# Patient Record
Sex: FEMALE | Race: WHITE | NOT HISPANIC OR LATINO | URBAN - METROPOLITAN AREA
[De-identification: names, ages, dates, MRNs, and addresses within clinical notes are randomized per-mention and may not be internally consistent; named-entity substitution may affect disease eponyms.]

---

## 2024-05-03 ENCOUNTER — EMERGENCY (EMERGENCY)
Facility: HOSPITAL | Age: 79
LOS: 1 days | Discharge: HOME | End: 2024-05-03
Admitting: EMERGENCY MEDICINE
Payer: MEDICARE

## 2024-05-03 VITALS
DIASTOLIC BLOOD PRESSURE: 77 MMHG | HEIGHT: 62 IN | OXYGEN SATURATION: 98 % | TEMPERATURE: 99 F | WEIGHT: 117.95 LBS | SYSTOLIC BLOOD PRESSURE: 147 MMHG | RESPIRATION RATE: 18 BRPM | HEART RATE: 68 BPM

## 2024-05-03 PROCEDURE — 73030 X-RAY EXAM OF SHOULDER: CPT | Mod: 26,RT

## 2024-05-03 PROCEDURE — 99283 EMERGENCY DEPT VISIT LOW MDM: CPT | Mod: 25

## 2024-05-03 PROCEDURE — 73030 X-RAY EXAM OF SHOULDER: CPT

## 2024-05-03 PROCEDURE — 99284 EMERGENCY DEPT VISIT MOD MDM: CPT

## 2024-05-03 RX ORDER — OXYCODONE AND ACETAMINOPHEN 5; 325 MG/1; MG/1
1 TABLET ORAL
Qty: 12 | Refills: 0
Start: 2024-05-03 | End: 2024-05-06

## 2024-05-03 RX ORDER — OXYCODONE AND ACETAMINOPHEN 5; 325 MG/1; MG/1
1 TABLET ORAL ONCE
Refills: 0 | Status: DISCONTINUED | OUTPATIENT
Start: 2024-05-03 | End: 2024-05-03

## 2024-05-03 RX ORDER — IBUPROFEN 200 MG
600 TABLET ORAL ONCE
Refills: 0 | Status: COMPLETED | OUTPATIENT
Start: 2024-05-03 | End: 2024-05-03

## 2024-05-03 RX ORDER — IBUPROFEN 200 MG
1 TABLET ORAL
Qty: 15 | Refills: 0
Start: 2024-05-03 | End: 2024-05-07

## 2024-05-03 RX ADMIN — Medication 600 MILLIGRAM(S): at 15:30

## 2024-05-03 NOTE — ED PROVIDER NOTE - OBJECTIVE STATEMENT
The pt is a 79 y/o F, who presents to ED c/o R shoulder pain s/p trip and fall. Pain is 8/10, constant, aggravated w/touching and moving, has not taken any pain meds, is R hand dominant. Denies head trauma, loc, numbness or tingling to fingers, wrist or elbow pain, any other injuries.

## 2024-05-03 NOTE — ED ADULT NURSE NOTE - NSFALLRISKINTERV_ED_ALL_ED

## 2024-05-03 NOTE — ED PROVIDER NOTE - NSFOLLOWUPINSTRUCTIONS_ED_ALL_ED_FT
Humerus Fracture Treated With Immobilization  Right arm and hand showing skeleton with a humerus fracture.  The humerus is the large bone in the upper arm. A broken (fractured) humerus is often treated by wearing a cast, splint, or sling. This holds the broken pieces in place (immobilization) so they can heal.  What are the causes?  This condition may be caused by:  A fall.  A hard, direct hit to the arm.  A car accident.  What increases the risk?  You are more likely to develop this condition if:  You have a disease that makes the bones thin and weak.  You are elderly.  What are the signs or symptoms?  Pain.  Swelling.  Bruising.  Not being able to move your arm normally.  How is this treated?  Treatment involves wearing a cast, splint, or sling until your arm heals enough for you to begin range-of-motion exercises. You may also be prescribed pain medicine.  Follow these instructions at home:  If you have a cast or splint that cannot be taken off:  Do not put pressure on any part of the cast or splint until it is fully hardened. This may take several hours.  Do not stick anything inside the cast or splint to scratch your skin.  Check the skin around the cast or splint every day. Tell your doctor if you see problems.  You may put lotion on dry skin around the cast or splint. Do not put lotion on the skin under the cast or splint.  Keep the cast clean and dry.  If you have a splint or sling that can be taken off:  Wear the splint or sling as told by your doctor. Remove it only as told by your doctor.  Loosen the splint or sling if your fingers:  Tingle.  Become numb.  Turn cold and blue.  Keep the splint or sling clean and dry.  Bathing  Do not take baths, swim, or use a hot tub. Ask your doctor about taking showers or sponge baths.  If your cast, splint, or sling is not waterproof:  Do not let it get wet.  Cover it with a watertight covering when you take a bath or shower.  Managing pain, stiffness, and swelling  Bag of ice on a towel on the skin.  If told, put ice on the injured area. To do this:  If you have a removable splint or sling, take it off as told by your doctor.  Put ice in a plastic bag.  Place a towel between your skin and the bag or between your cast or splint that you cannot take off and the bag.  Leave the ice on for 20 minutes, 2–3 times a day.  Take off the ice if your skin turns bright red. This is very important. If you cannot feel pain, heat, or cold, you have a greater risk of damage to the area.  Move your fingers often.  Raise the injured area above the level of your heart while you are sitting or lying down.  Driving  Do not drive or use machines while taking prescription pain medicine.  Ask your doctor when it is safe to drive if you have a cast, splint, or sling on your arm.  Activity  Do not lift anything until your doctor says that it is safe.  Return to your normal activities when your doctor says that it is safe.  Do range-of-motion exercises only as told by your doctor.  General instructions  Do not smoke or use any products that contain nicotine or tobacco. These can make it take longer for your bones to heal. If you need help quitting, ask your doctor.  Take over-the-counter and prescription medicines only as told by your doctor.  If told, take steps to prevent problems with pooping (constipation). You may need to:  Drink enough fluid to keep your pee (urine) pale yellow.  Take medicines. You will be told what medicines to take.  Eat foods that are high in fiber. These include beans, whole grains, and fresh fruits and vegetables.  Limit foods that are high in fat and sugar. These include fried or sweet foods.  Keep all follow-up visits.  Contact a doctor if:  You have any new pain, swelling, or bruising.  Your pain, swelling, and bruising do not get better.  Your cast, splint, or sling becomes loose or damaged.  Get help right away if:  Your skin or fingers on your injured arm turn blue or gray.  Your arm is cold or numb.  You have very bad pain in your injured arm.  Summary  The humerus is the large bone in the upper arm.  A broken humerus is often treated by wearing a cast, splint, or sling.  Wear a splint or sling as told by your doctor. Remove it only as told by your doctor.  Move your fingers often.

## 2024-05-03 NOTE — ED ADULT NURSE NOTE - OBJECTIVE STATEMENT
Pt A&ox4 and able to speak in complete sentences. Pt breathing even and unlabored with equal chest rise and fall. pt arrived d/t left arm pain after fall today. pt denies LOC, head strike, being on blood thinners, cp, sob, fevers, chills, n, v, lightheadedness, dizziness, sob, numbness.

## 2024-05-03 NOTE — ED PROVIDER NOTE - PATIENT PORTAL LINK FT
You can access the FollowMyHealth Patient Portal offered by [facility] by registering at the following website: [email or web address]. By joining [employer]’s FollowMyHealth portal, you will also be able to view your health information using other applications (apps) compatible with our system.

## 2024-05-03 NOTE — ED PROVIDER NOTE - MUSCULOSKELETAL, MLM
Spine appears normal, range of motion is not limited, no muscle or joint tenderness; R shoulder: no clavicular tend, no scapular tend, + tend over anterior shoulder, limited ROM 2/2 to pain, no elbow or wrist tend, radial pulse 2+, no U/M/R nerve deficits

## 2024-05-07 DIAGNOSIS — M25.511 PAIN IN RIGHT SHOULDER: ICD-10-CM
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
## 2024-05-07 DIAGNOSIS — S42.251A DISPLACED FRACTURE OF GREATER TUBEROSITY OF RIGHT HUMERUS, INITIAL ENCOUNTER FOR CLOSED FRACTURE: ICD-10-CM
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
## 2025-02-25 NOTE — ED ADULT TRIAGE NOTE - GLASGOW COMA SCALE: BEST MOTOR RESPONSE, MLM
----- Message from Aneudy Maradiaga PA-C sent at 2/18/2025  3:34 PM CST -----  Please inform patient's father that the patient's liquid phase gastric emptying was also prolonged again consistent with gastroparesis. Please continue recommendations discussed in the solid phase gastric emptying result note. If reglan is effective, for long term treatment would recommend seeing Dr. Patton in [place] for consideration of treatment with domperidone. If patient's father is agreeable I can place a referral    (M6) obeys commands